# Patient Record
(demographics unavailable — no encounter records)

---

## 2025-02-14 NOTE — HISTORY OF PRESENT ILLNESS
[TextBox_4] : 27-year-old P 0-0-0-0 LMP 1/24/2025 presents for annual GYN exam. [PapSmeardate] : 2/14/25 [Men] : men [Vaginal] : vaginal

## 2025-02-14 NOTE — PHYSICAL EXAM
[Chaperone Present] : A chaperone was present in the examining room during all aspects of the physical examination [FreeTextEntry2] : Gita DURON [Examination Of The Breasts] : a normal appearance [No Masses] : no breast masses were palpable [Labia Majora] : normal [Labia Minora] : normal [Normal] : normal [Uterine Adnexae] : normal